# Patient Record
Sex: FEMALE | Race: OTHER
[De-identification: names, ages, dates, MRNs, and addresses within clinical notes are randomized per-mention and may not be internally consistent; named-entity substitution may affect disease eponyms.]

---

## 2018-07-12 ENCOUNTER — HOSPITAL ENCOUNTER (OUTPATIENT)
Dept: HOSPITAL 11 - JP.SDS | Age: 63
Discharge: HOME | End: 2018-07-12
Attending: SURGERY
Payer: MEDICARE

## 2018-07-12 VITALS — DIASTOLIC BLOOD PRESSURE: 71 MMHG | SYSTOLIC BLOOD PRESSURE: 131 MMHG

## 2018-07-12 DIAGNOSIS — F17.200: ICD-10-CM

## 2018-07-12 DIAGNOSIS — K63.5: ICD-10-CM

## 2018-07-12 DIAGNOSIS — E78.5: ICD-10-CM

## 2018-07-12 DIAGNOSIS — I10: ICD-10-CM

## 2018-07-12 DIAGNOSIS — I25.10: ICD-10-CM

## 2018-07-12 DIAGNOSIS — K64.4: ICD-10-CM

## 2018-07-12 DIAGNOSIS — Z80.0: ICD-10-CM

## 2018-07-12 DIAGNOSIS — Z12.11: Primary | ICD-10-CM

## 2018-07-12 PROCEDURE — 45380 COLONOSCOPY AND BIOPSY: CPT

## 2018-07-12 NOTE — OR
DATE OF PROCEDURE:  07/12/2018

 

PROCEDURE:  Colonoscopy.

 

FINDINGS:  Sigmoid colon polyp, approximately 5 mm, completely removed using cold biopsy

forceps.

 

COMPLICATIONS:  None.

 

ASSISTANT:  None.

 

ANESTHESIA:  MAC.

 

PREOPERATIVE DIAGNOSIS:  Family history of colorectal cancer.

 

POSTOPERATIVE DIAGNOSIS:  Family history of colorectal cancer.

 

RISKS:  Risks, benefits, alternatives, and limitations including, but not limited to

infection, bleeding, and perforation were explained to the patient, who wished to proceed.

 

PROCEDURE IN DETAIL:  The patient was placed in left lateral decubitus position.  The

patient was noted to have mild external hemorrhoids.  Digital rectal exam was performed

without abnormality.  The scope was introduced and advanced atraumatically to the ileocecal

valve.

 

The scope was brought back to the ascending, transverse, descending colon, and retroflexed.

 

The aforementioned polyp was small, most likely hyperplastic.  No other abnormalities.  No

diverticulosis.  No abnormalities on retroflexion.  The patient tolerated the procedure

well.

 

 

 

 

Harsh Low MD

DD:  07/12/2018 09:35:02

DT:  07/12/2018 09:48:50

Job #:  [id number]

## 2024-09-09 ENCOUNTER — HOSPITAL ENCOUNTER (EMERGENCY)
Dept: HOSPITAL 11 - JP.ED | Age: 69
Discharge: HOME | End: 2024-09-09
Payer: MEDICARE

## 2024-09-09 VITALS — SYSTOLIC BLOOD PRESSURE: 138 MMHG | DIASTOLIC BLOOD PRESSURE: 88 MMHG | HEART RATE: 113 BPM

## 2024-09-09 DIAGNOSIS — Z90.710: ICD-10-CM

## 2024-09-09 DIAGNOSIS — E66.9: ICD-10-CM

## 2024-09-09 DIAGNOSIS — Z79.899: ICD-10-CM

## 2024-09-09 DIAGNOSIS — I48.91: Primary | ICD-10-CM

## 2024-09-09 DIAGNOSIS — E78.00: ICD-10-CM

## 2024-09-09 DIAGNOSIS — Z79.82: ICD-10-CM

## 2024-09-09 DIAGNOSIS — F17.200: ICD-10-CM

## 2024-09-09 DIAGNOSIS — Z90.49: ICD-10-CM

## 2024-09-09 DIAGNOSIS — I10: ICD-10-CM

## 2024-09-09 LAB
ANION GAP SERPL CALC-SCNC: 12.5 MMOL/L (ref 5–14)
BASOPHILS # BLD AUTO: 0.08 K/UL (ref 0–0.1)
BASOPHILS NFR BLD AUTO: 1 % (ref 0.1–1.3)
BUN SERPL-MCNC: 12 MG/DL (ref 7–18)
CALCIUM SERPL-MCNC: 9.2 MG/DL (ref 8.5–10.1)
CHLORIDE SERPL-SCNC: 106 MMOL/L (ref 100–108)
CO2 SERPL-SCNC: 25 MMOL/L (ref 21–32)
CREAT CL 24H UR+SERPL-VRATE: 53.71 ML/MIN
CREAT SERPL-MCNC: 0.7 MG/DL (ref 0.6–1)
EOSINOPHIL # BLD AUTO: 0.59 K/UL (ref 0–0.4)
EOSINOPHIL NFR BLD AUTO: 7.5 % (ref 0–5.4)
GLUCOSE SERPL-MCNC: 93 MG/DL (ref 74–106)
HCT VFR BLD AUTO: 36.9 % (ref 34.3–46)
HGB BLD-MCNC: 12.6 G/DL (ref 11.2–15.5)
IMM GRANULOCYTES # BLD: 0.04 K/UL (ref 0–0.23)
IMM GRANULOCYTES NFR BLD: 0.5 % (ref 0–0.7)
LYMPHOCYTES # BLD AUTO: 1.15 K/UL (ref 0.8–3.3)
LYMPHOCYTES NFR BLD AUTO: 14.6 % (ref 11.4–47.7)
MCH RBC QN AUTO: 28.9 PG (ref 31.6–35.5)
MCHC RBC AUTO-ENTMCNC: 34.1 G/DL (ref 31.6–35.5)
MCHC RBC AUTO-ENTMCNC: 84.6 FL (ref 81.4–99)
MONOCYTES # BLD AUTO: 0.53 K/UL (ref 0.2–0.9)
MONOCYTES NFR BLD AUTO: 6.7 % (ref 3.3–12.6)
NEUTROPHILS # BLD AUTO: 5.48 K/UL (ref 1–7.6)
NEUTROPHILS NFR BLD AUTO: 69.7 % (ref 40–78.1)
PLATELET # BLD AUTO: 299 K/UL (ref 130–375)
POTASSIUM SERPL-SCNC: 4.5 MMOL/L (ref 3.6–5.2)
RBC # BLD AUTO: 4.36 M/UL (ref 3.77–5.24)
SODIUM SERPL-SCNC: 139 MMOL/L (ref 140–148)
TROPONIN I SERPL HS-MCNC: 4 PG/ML (ref ?–60.3)
WBC # BLD AUTO: 7.9 K/UL (ref 3.2–11)